# Patient Record
Sex: FEMALE | URBAN - METROPOLITAN AREA
[De-identification: names, ages, dates, MRNs, and addresses within clinical notes are randomized per-mention and may not be internally consistent; named-entity substitution may affect disease eponyms.]

---

## 2022-07-18 ENCOUNTER — APPOINTMENT (RX ONLY)
Dept: URBAN - METROPOLITAN AREA CLINIC 151 | Facility: CLINIC | Age: 42
Setting detail: DERMATOLOGY
End: 2022-07-18

## 2022-07-18 DIAGNOSIS — D18.0 HEMANGIOMA: ICD-10-CM

## 2022-07-18 DIAGNOSIS — D22 MELANOCYTIC NEVI: ICD-10-CM

## 2022-07-18 DIAGNOSIS — L73.8 OTHER SPECIFIED FOLLICULAR DISORDERS: ICD-10-CM

## 2022-07-18 DIAGNOSIS — L91.8 OTHER HYPERTROPHIC DISORDERS OF THE SKIN: ICD-10-CM

## 2022-07-18 DIAGNOSIS — L81.4 OTHER MELANIN HYPERPIGMENTATION: ICD-10-CM

## 2022-07-18 PROBLEM — D22.39 MELANOCYTIC NEVI OF OTHER PARTS OF FACE: Status: ACTIVE | Noted: 2022-07-18

## 2022-07-18 PROBLEM — D18.01 HEMANGIOMA OF SKIN AND SUBCUTANEOUS TISSUE: Status: ACTIVE | Noted: 2022-07-18

## 2022-07-18 PROCEDURE — 99203 OFFICE O/P NEW LOW 30 MIN: CPT

## 2022-07-18 PROCEDURE — ? COUNSELING

## 2022-07-18 PROCEDURE — ? OBSERVATION AND MEASURE

## 2022-07-18 PROCEDURE — ? DIAGNOSIS COMMENT

## 2022-07-18 ASSESSMENT — LOCATION DETAILED DESCRIPTION DERM
LOCATION DETAILED: LEFT MEDIAL BREAST 10-11:00 REGION
LOCATION DETAILED: RIGHT SUPERIOR MEDIAL BUCCAL CHEEK
LOCATION DETAILED: RIGHT INFERIOR ANTERIOR NECK
LOCATION DETAILED: LEFT INFERIOR ANTERIOR NECK

## 2022-07-18 ASSESSMENT — LOCATION SIMPLE DESCRIPTION DERM
LOCATION SIMPLE: LEFT ANTERIOR NECK
LOCATION SIMPLE: RIGHT CHEEK
LOCATION SIMPLE: LEFT BREAST
LOCATION SIMPLE: RIGHT ANTERIOR NECK

## 2022-07-18 ASSESSMENT — LOCATION ZONE DERM
LOCATION ZONE: NECK
LOCATION ZONE: FACE
LOCATION ZONE: TRUNK

## 2022-07-18 NOTE — PROCEDURE: OBSERVATION
Detail Level: Detailed
Size Of Lesion: 1mm
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

## 2022-07-18 NOTE — PROCEDURE: COUNSELING
Detail Level: Simple
Sunscreen Recommendations: CHEMICAL BLOCKERS\\n\\nEucerin Daily Protection Face Lotion \\nNeutrogena Invisible Daily Defense Serum \\nCeraVe Ultra-Light Moisturizing Lotion \\nNeutrogena Hydroboost \\nCetaphil Face Moisturizer \\nLa Roche Posay Toleriane Double Repair Facial Moisturizer \\nElta MD UV Clear \\nLa Roche Posay Antihelios Melt-In Milk Sunscreen \\n\\nPHYSICAL BLOCKERS\\n\\nAveeno Positively Mineral Sunscreen \\nCetaphil Sheer Mineral Face \\nCeraVe Hydrating Mineral Sunscreen (sheer tint) \\nLa Roche Posay Antihelios Mineral Ultra LIght Fluid Face Sunscreen  \\nElta MD UV Daily \\nAvene Solaire UV MIneral Multi-Defense Sunscreen\\nSupergoop CC Screen (multiple shades)\\nTizo AM Replenish (tinted/non-tinted)
Detail Level: Detailed
Patient Specific Counseling (Will Not Stick From Patient To Patient): - possible blue Nevus will monitor and biopsy in the future if changes\\n- per patient growing,but very gradually. No symptoms

## 2022-07-18 NOTE — HPI: EVALUATION OF SKIN LESION(S)
Hpi Title: Evaluation of Skin Lesions
Additional History: Patient has a few spots on the face and neck that are newer, that have been present for a year or less.

## 2022-07-18 NOTE — PROCEDURE: DIAGNOSIS COMMENT
Comment: Unclear if these are true pseudopods, discussed biopsy vs watchful waiting. Patient would like to avoid scar if possible, will do 6mo followup visit.
Render Risk Assessment In Note?: no
Detail Level: Simple